# Patient Record
Sex: FEMALE | Race: OTHER | HISPANIC OR LATINO
[De-identification: names, ages, dates, MRNs, and addresses within clinical notes are randomized per-mention and may not be internally consistent; named-entity substitution may affect disease eponyms.]

---

## 2024-07-17 ENCOUNTER — APPOINTMENT (OUTPATIENT)
Dept: ORTHOPEDIC SURGERY | Facility: CLINIC | Age: 39
End: 2024-07-17
Payer: COMMERCIAL

## 2024-07-17 DIAGNOSIS — M25.562 PAIN IN LEFT KNEE: ICD-10-CM

## 2024-07-17 DIAGNOSIS — G89.29 PAIN IN LEFT KNEE: ICD-10-CM

## 2024-07-17 DIAGNOSIS — Z00.00 ENCOUNTER FOR GENERAL ADULT MEDICAL EXAMINATION W/OUT ABNORMAL FINDINGS: ICD-10-CM

## 2024-07-17 PROCEDURE — 99204 OFFICE O/P NEW MOD 45 MIN: CPT

## 2024-07-22 PROBLEM — M25.562 CHRONIC PAIN OF LEFT KNEE: Status: ACTIVE | Noted: 2024-07-17

## 2024-08-02 ENCOUNTER — APPOINTMENT (OUTPATIENT)
Dept: ORTHOPEDIC SURGERY | Facility: CLINIC | Age: 39
End: 2024-08-02
Payer: COMMERCIAL

## 2024-08-02 ENCOUNTER — OUTPATIENT (OUTPATIENT)
Dept: OUTPATIENT SERVICES | Facility: HOSPITAL | Age: 39
LOS: 1 days | End: 2024-08-02
Payer: COMMERCIAL

## 2024-08-02 ENCOUNTER — APPOINTMENT (OUTPATIENT)
Dept: MRI IMAGING | Facility: CLINIC | Age: 39
End: 2024-08-02

## 2024-08-02 VITALS
HEIGHT: 67 IN | HEART RATE: 64 BPM | OXYGEN SATURATION: 100 % | SYSTOLIC BLOOD PRESSURE: 125 MMHG | DIASTOLIC BLOOD PRESSURE: 79 MMHG | BODY MASS INDEX: 26.37 KG/M2 | WEIGHT: 168 LBS

## 2024-08-02 DIAGNOSIS — G89.29 PAIN IN LEFT KNEE: ICD-10-CM

## 2024-08-02 DIAGNOSIS — Z86.39 PERSONAL HISTORY OF OTHER ENDOCRINE, NUTRITIONAL AND METABOLIC DISEASE: ICD-10-CM

## 2024-08-02 DIAGNOSIS — M25.562 PAIN IN LEFT KNEE: ICD-10-CM

## 2024-08-02 DIAGNOSIS — S83.242D OTHER TEAR OF MEDIAL MENISCUS, CURRENT INJURY, LEFT KNEE, SUBSEQUENT ENCOUNTER: ICD-10-CM

## 2024-08-02 DIAGNOSIS — Z86.79 PERSONAL HISTORY OF OTHER DISEASES OF THE CIRCULATORY SYSTEM: ICD-10-CM

## 2024-08-02 PROCEDURE — 73721 MRI JNT OF LWR EXTRE W/O DYE: CPT | Mod: 26,LT

## 2024-08-02 PROCEDURE — 73721 MRI JNT OF LWR EXTRE W/O DYE: CPT

## 2024-08-02 PROCEDURE — 99213 OFFICE O/P EST LOW 20 MIN: CPT

## 2024-08-02 RX ORDER — LISINOPRIL 5 MG/1
5 TABLET ORAL
Refills: 0 | Status: ACTIVE | COMMUNITY

## 2024-08-02 RX ORDER — CLONAZEPAM 2 MG/1
TABLET ORAL
Refills: 0 | Status: ACTIVE | COMMUNITY

## 2024-08-02 RX ORDER — DULOXETINE HYDROCHLORIDE 60 MG/1
60 CAPSULE, DELAYED RELEASE ORAL
Refills: 0 | Status: ACTIVE | COMMUNITY

## 2024-08-02 NOTE — DISCUSSION/SUMMARY
[de-identified] : Impression: Left knee pain with medial meniscus tear.  Plan: I have referred her to Dr. Medardo MD for surgical management of this meniscus tear and have provided his contact information.

## 2024-08-02 NOTE — PHYSICAL EXAM
[de-identified] :  EXAM: [id number] - MR KNEE LT  - ORDERED BY: JR. CULP  PROCEDURE DATE:  08/02/2024  INTERPRETATION:  CLINICAL INDICATION: Left-sided knee pain. Baker's cyst. 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 Prior Studies: None.  FINDINGS:  MEDIAL COMPARTMENT: There is a horizontal undersurface tear involving the body/posterior horn junction of the medial meniscus. Associated intrasubstance degeneration is seen within the posterior horn. There is superficial chondral fibrillation along the inner aspect medial femoral condyle without associated subchondral signal abnormality. LATERAL COMPARTMENT: The lateral meniscus is intact. The articular surfaces are intact. PATELLOFEMORAL COMPARTMENT: The articular surfaces are intact. The patellar retinacula are intact. PROXIMAL TIBIOFIBULAR JOINT: Intact. EXTENSOR MECHANISM: Intact. CRUCIATE LIGAMENTS: Intact. MEDIAL AND LATERAL COLLATERAL LIGAMENTS: The medial collateral ligament is intact.  The semimembranous tendinous insertion is preserved. The iliotibial band, fibular collateral ligament, biceps femoris tendon, and popliteus tendons are intact. SYNOVIUM: Small joint fluid. POPLITEAL FOSSA: Large Baker's cyst measuring 2.5 x 4.1 x 8.2 cm with mild seepage of fluid about the superficial myofascia of the medial gastrocnemius muscle. SUBCUTANEOUS TISSUES: Mild subcutaneous edema about the knee. NERVES: Within normal limits. MUSCLES: Within normal limits. MARROW: There is no acute fracture or osteonecrosis.  IMPRESSION: Horizontal undersurface tear involving the body/posterior horn junction of the medial meniscus. Baker's cyst measuring up to 8.2 cm with seepage of fluid about the superficial myofascia of the medial gastrocnemius muscle. Superficial chondral wear along the inner aspect medial femoral condyle.

## 2024-08-02 NOTE — DISCUSSION/SUMMARY
[de-identified] : Impression: Left knee pain with medial meniscus tear.  Plan: I have referred her to Dr. Medardo MD for surgical management of this meniscus tear and have provided his contact information.

## 2024-08-02 NOTE — PHYSICAL EXAM
[de-identified] :  EXAM: [id number] - MR KNEE LT  - ORDERED BY: JR. CULP  PROCEDURE DATE:  08/02/2024  INTERPRETATION:  CLINICAL INDICATION: Left-sided knee pain. Baker's cyst. 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 Prior Studies: None.  FINDINGS:  MEDIAL COMPARTMENT: There is a horizontal undersurface tear involving the body/posterior horn junction of the medial meniscus. Associated intrasubstance degeneration is seen within the posterior horn. There is superficial chondral fibrillation along the inner aspect medial femoral condyle without associated subchondral signal abnormality. LATERAL COMPARTMENT: The lateral meniscus is intact. The articular surfaces are intact. PATELLOFEMORAL COMPARTMENT: The articular surfaces are intact. The patellar retinacula are intact. PROXIMAL TIBIOFIBULAR JOINT: Intact. EXTENSOR MECHANISM: Intact. CRUCIATE LIGAMENTS: Intact. MEDIAL AND LATERAL COLLATERAL LIGAMENTS: The medial collateral ligament is intact.  The semimembranous tendinous insertion is preserved. The iliotibial band, fibular collateral ligament, biceps femoris tendon, and popliteus tendons are intact. SYNOVIUM: Small joint fluid. POPLITEAL FOSSA: Large Baker's cyst measuring 2.5 x 4.1 x 8.2 cm with mild seepage of fluid about the superficial myofascia of the medial gastrocnemius muscle. SUBCUTANEOUS TISSUES: Mild subcutaneous edema about the knee. NERVES: Within normal limits. MUSCLES: Within normal limits. MARROW: There is no acute fracture or osteonecrosis.  IMPRESSION: Horizontal undersurface tear involving the body/posterior horn junction of the medial meniscus. Baker's cyst measuring up to 8.2 cm with seepage of fluid about the superficial myofascia of the medial gastrocnemius muscle. Superficial chondral wear along the inner aspect medial femoral condyle.

## 2024-08-02 NOTE — HISTORY OF PRESENT ILLNESS
[de-identified] : Betty is a 38 year old female who presents today with left knee pain. She is here to discuss MRI results.  The pain started several months ago. She is having sharp intermittent pain on the medial side of the left knee. It hurts to bend, go up and down stairs, get off a chair, twist and turn, and sleep at night. She has tried NSAIDs, Ice, rest, home exercise program, heat and nothing seems to make it better. It is limiting her activities. We MRI'ed her knee and she has a medial meniscus tear and a large popliteal cyst.
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
## 2024-08-02 NOTE — HISTORY OF PRESENT ILLNESS
[de-identified] : Betty is a 38 year old female who presents today with left knee pain. She is here to discuss MRI results.  The pain started several months ago. She is having sharp intermittent pain on the medial side of the left knee. It hurts to bend, go up and down stairs, get off a chair, twist and turn, and sleep at night. She has tried NSAIDs, Ice, rest, home exercise program, heat and nothing seems to make it better. It is limiting her activities. We MRI'ed her knee and she has a medial meniscus tear and a large popliteal cyst.